# Patient Record
Sex: FEMALE | Race: BLACK OR AFRICAN AMERICAN | Employment: FULL TIME | URBAN - METROPOLITAN AREA
[De-identification: names, ages, dates, MRNs, and addresses within clinical notes are randomized per-mention and may not be internally consistent; named-entity substitution may affect disease eponyms.]

---

## 2019-10-19 ENCOUNTER — HOSPITAL ENCOUNTER (EMERGENCY)
Age: 24
Discharge: HOME | End: 2019-10-19
Attending: EMERGENCY MEDICINE
Payer: COMMERCIAL

## 2019-10-19 VITALS
BODY MASS INDEX: 53.92 KG/M2 | OXYGEN SATURATION: 99 % | SYSTOLIC BLOOD PRESSURE: 156 MMHG | WEIGHT: 293 LBS | DIASTOLIC BLOOD PRESSURE: 80 MMHG | RESPIRATION RATE: 16 BRPM | HEIGHT: 62 IN | HEART RATE: 74 BPM | TEMPERATURE: 98.1 F

## 2019-10-19 DIAGNOSIS — N93.8 DUB (DYSFUNCTIONAL UTERINE BLEEDING): ICD-10-CM

## 2019-10-19 DIAGNOSIS — R10.2 PELVIC CRAMPING: Primary | ICD-10-CM

## 2019-10-19 LAB
ALBUMIN SERPL-MCNC: 3.4 G/DL (ref 3.5–5)
ALBUMIN/GLOB SERPL: 0.5 {RATIO} (ref 1.2–3.5)
ALP SERPL-CCNC: 65 U/L (ref 50–130)
ALT SERPL-CCNC: 76 U/L (ref 12–65)
ANION GAP SERPL CALC-SCNC: 9 MMOL/L (ref 7–16)
APPEARANCE UR: ABNORMAL
AST SERPL-CCNC: 57 U/L (ref 15–37)
BACTERIA URNS QL MICRO: 0 /HPF
BASOPHILS # BLD: 0 K/UL (ref 0–0.2)
BASOPHILS NFR BLD: 0 % (ref 0–2)
BILIRUB SERPL-MCNC: 0.5 MG/DL (ref 0.2–1.1)
BILIRUB UR QL: ABNORMAL
BUN SERPL-MCNC: 8 MG/DL (ref 6–23)
CALCIUM SERPL-MCNC: 9.5 MG/DL (ref 8.3–10.4)
CASTS URNS QL MICRO: 0 /LPF
CHLORIDE SERPL-SCNC: 102 MMOL/L (ref 98–107)
CO2 SERPL-SCNC: 27 MMOL/L (ref 21–32)
COLOR UR: ABNORMAL
CREAT SERPL-MCNC: 0.77 MG/DL (ref 0.6–1)
DIFFERENTIAL METHOD BLD: ABNORMAL
EOSINOPHIL # BLD: 0.2 K/UL (ref 0–0.8)
EOSINOPHIL NFR BLD: 1 % (ref 0.5–7.8)
EPI CELLS #/AREA URNS HPF: ABNORMAL /HPF
ERYTHROCYTE [DISTWIDTH] IN BLOOD BY AUTOMATED COUNT: 20.7 % (ref 11.9–14.6)
GLOBULIN SER CALC-MCNC: 6.3 G/DL (ref 2.3–3.5)
GLUCOSE SERPL-MCNC: 95 MG/DL (ref 65–100)
GLUCOSE UR STRIP.AUTO-MCNC: NEGATIVE MG/DL
HCG SERPL QL: NEGATIVE
HCT VFR BLD AUTO: 34.6 % (ref 35.8–46.3)
HGB BLD-MCNC: 10.4 G/DL (ref 11.7–15.4)
HGB UR QL STRIP: ABNORMAL
IMM GRANULOCYTES # BLD AUTO: 0.1 K/UL (ref 0–0.5)
IMM GRANULOCYTES NFR BLD AUTO: 1 % (ref 0–5)
KETONES UR QL STRIP.AUTO: ABNORMAL MG/DL
LEUKOCYTE ESTERASE UR QL STRIP.AUTO: ABNORMAL
LYMPHOCYTES # BLD: 3.9 K/UL (ref 0.5–4.6)
LYMPHOCYTES NFR BLD: 32 % (ref 13–44)
MCH RBC QN AUTO: 19.7 PG (ref 26.1–32.9)
MCHC RBC AUTO-ENTMCNC: 30.1 G/DL (ref 31.4–35)
MCV RBC AUTO: 65.4 FL (ref 79.6–97.8)
MONOCYTES # BLD: 0.7 K/UL (ref 0.1–1.3)
MONOCYTES NFR BLD: 5 % (ref 4–12)
NEUTS SEG # BLD: 7.4 K/UL (ref 1.7–8.2)
NEUTS SEG NFR BLD: 61 % (ref 43–78)
NITRITE UR QL STRIP.AUTO: NEGATIVE
NRBC # BLD: 0 K/UL (ref 0–0.2)
PH UR STRIP: 5.5 [PH] (ref 5–9)
PLATELET # BLD AUTO: 589 K/UL (ref 150–450)
PMV BLD AUTO: 9.6 FL (ref 9.4–12.3)
POTASSIUM SERPL-SCNC: 4.6 MMOL/L (ref 3.5–5.1)
PROT SERPL-MCNC: 9.7 G/DL (ref 6.3–8.2)
PROT UR STRIP-MCNC: 100 MG/DL
RBC # BLD AUTO: 5.29 M/UL (ref 4.05–5.2)
RBC #/AREA URNS HPF: >100 /HPF
SERVICE CMNT-IMP: NORMAL
SODIUM SERPL-SCNC: 138 MMOL/L (ref 136–145)
SP GR UR REFRACTOMETRY: 1.02 (ref 1–1.02)
UROBILINOGEN UR QL STRIP.AUTO: 1 EU/DL (ref 0.2–1)
WBC # BLD AUTO: 12.2 K/UL (ref 4.3–11.1)
WBC URNS QL MICRO: ABNORMAL /HPF
WET PREP GENITAL: NORMAL
WET PREP GENITAL: NORMAL

## 2019-10-19 PROCEDURE — 87210 SMEAR WET MOUNT SALINE/INK: CPT

## 2019-10-19 PROCEDURE — 87491 CHLMYD TRACH DNA AMP PROBE: CPT

## 2019-10-19 PROCEDURE — 84703 CHORIONIC GONADOTROPIN ASSAY: CPT

## 2019-10-19 PROCEDURE — 96374 THER/PROPH/DIAG INJ IV PUSH: CPT | Performed by: EMERGENCY MEDICINE

## 2019-10-19 PROCEDURE — 85025 COMPLETE CBC W/AUTO DIFF WBC: CPT

## 2019-10-19 PROCEDURE — 81001 URINALYSIS AUTO W/SCOPE: CPT

## 2019-10-19 PROCEDURE — 74011250636 HC RX REV CODE- 250/636: Performed by: NURSE PRACTITIONER

## 2019-10-19 PROCEDURE — 80053 COMPREHEN METABOLIC PANEL: CPT

## 2019-10-19 PROCEDURE — 99284 EMERGENCY DEPT VISIT MOD MDM: CPT | Performed by: EMERGENCY MEDICINE

## 2019-10-19 PROCEDURE — 74011250637 HC RX REV CODE- 250/637: Performed by: NURSE PRACTITIONER

## 2019-10-19 RX ORDER — KETOROLAC TROMETHAMINE 30 MG/ML
30 INJECTION, SOLUTION INTRAMUSCULAR; INTRAVENOUS
Status: COMPLETED | OUTPATIENT
Start: 2019-10-19 | End: 2019-10-19

## 2019-10-19 RX ORDER — NORETHINDRONE 5 MG/1
5 TABLET ORAL DAILY
COMMUNITY
End: 2019-10-19

## 2019-10-19 RX ORDER — HYOSCYAMINE SULFATE 0.12 MG/1
0.12 TABLET SUBLINGUAL
Status: COMPLETED | OUTPATIENT
Start: 2019-10-19 | End: 2019-10-19

## 2019-10-19 RX ORDER — IBUPROFEN 600 MG/1
600 TABLET ORAL
Qty: 20 TAB | Refills: 0 | Status: SHIPPED | OUTPATIENT
Start: 2019-10-19

## 2019-10-19 RX ORDER — MEDROXYPROGESTERONE ACETATE 10 MG/1
10 TABLET ORAL DAILY
Qty: 5 TAB | Refills: 0 | Status: SHIPPED | OUTPATIENT
Start: 2019-10-19 | End: 2019-10-24

## 2019-10-19 RX ORDER — HYOSCYAMINE SULFATE 0.12 MG/1
0.12 TABLET SUBLINGUAL
Qty: 20 TAB | Refills: 0 | Status: SHIPPED | OUTPATIENT
Start: 2019-10-19

## 2019-10-19 RX ADMIN — HYOSCYAMINE SULFATE 0.12 MG: 0.12 TABLET ORAL; SUBLINGUAL at 14:15

## 2019-10-19 RX ADMIN — KETOROLAC TROMETHAMINE 30 MG: 30 INJECTION, SOLUTION INTRAMUSCULAR at 14:15

## 2019-10-19 NOTE — ED PROVIDER NOTES
27-year-old female presents for complaint of increased cramping with vaginal bleeding. She has been seeing her GYN physician since September due to irregular vaginal bleeding. She was placed on orphenadrine at that time she has had it refilled one time which she is still on. She states that her bleeding is about the same with clots about the same. But today she is having abnormal cramping. She denies nausea vomiting diarrhea fever chills weakness or dizziness. She is here today because of the cramping and she is tired of bleeding. No past medical history on file. No past surgical history on file. No family history on file.     Social History     Socioeconomic History    Marital status: SINGLE     Spouse name: Not on file    Number of children: Not on file    Years of education: Not on file    Highest education level: Not on file   Occupational History    Not on file   Social Needs    Financial resource strain: Not on file    Food insecurity:     Worry: Not on file     Inability: Not on file    Transportation needs:     Medical: Not on file     Non-medical: Not on file   Tobacco Use    Smoking status: Never Smoker    Smokeless tobacco: Never Used   Substance and Sexual Activity    Alcohol use: Never     Frequency: Never    Drug use: Never    Sexual activity: Not on file   Lifestyle    Physical activity:     Days per week: Not on file     Minutes per session: Not on file    Stress: Not on file   Relationships    Social connections:     Talks on phone: Not on file     Gets together: Not on file     Attends Rastafari service: Not on file     Active member of club or organization: Not on file     Attends meetings of clubs or organizations: Not on file     Relationship status: Not on file    Intimate partner violence:     Fear of current or ex partner: Not on file     Emotionally abused: Not on file     Physically abused: Not on file     Forced sexual activity: Not on file   Other Topics Concern    Not on file   Social History Narrative    Not on file         ALLERGIES: Patient has no known allergies. Review of Systems   Constitutional: Negative for chills and fever. HENT: Negative for facial swelling and mouth sores. Eyes: Negative for discharge and redness. Respiratory: Negative for cough and shortness of breath. Cardiovascular: Negative for chest pain and leg swelling. Gastrointestinal: Negative for diarrhea, nausea and vomiting. Genitourinary: Positive for pelvic pain and vaginal bleeding. Negative for dysuria and flank pain. Musculoskeletal: Negative for gait problem and joint swelling. Skin: Negative for color change and wound. Neurological: Negative for dizziness and weakness. Psychiatric/Behavioral: Negative for confusion and decreased concentration. Vitals:    10/19/19 1124   BP: (!) 170/95   Pulse: 78   Resp: 18   Temp: 98 °F (36.7 °C)   SpO2: 98%   Weight: (!) 167.4 kg (369 lb)   Height: 5' 2\" (1.575 m)            Physical Exam   Constitutional: She is oriented to person, place, and time. She appears well-developed and well-nourished. HENT:   Head: Normocephalic and atraumatic. Eyes: Pupils are equal, round, and reactive to light. EOM are normal.   Neck: Normal range of motion. Neck supple. Cardiovascular: Normal rate and regular rhythm. Pulmonary/Chest: Effort normal and breath sounds normal.   Abdominal: Soft. There is tenderness in the suprapubic area. Musculoskeletal: Normal range of motion. Neurological: She is alert and oriented to person, place, and time. Skin: Skin is warm and dry. Capillary refill takes less than 2 seconds. She is not diaphoretic. Psychiatric: She has a normal mood and affect. Her behavior is normal. Thought content normal.   Nursing note and vitals reviewed. MDM  Number of Diagnoses or Management Options  Diagnosis management comments: Pelvic exam with mild bleeding present.   Mild suprapubic tenderness. We will evaluate urinalysis as well as baseline blood work and provide pain control. She denies that she is sexually active since she started her northinodrin    1:30 PM  D/w Dr Devon Johnson, pt still has not voided. Will provide pain control in ed, stop bcp she is on and start provera for 5 days at time of dc. Waiting urine  1:52 PM  Pt has just voided, but not enough to eval in ed. Sent to lab. Will provide levsin while waiting.  2:49 PM  Pt has received levsin and toradol with little relief of cramps. Urine without bacteria. Discussed w pt and family provera and home with heating pad, return if sx worsen or are not relieved with this and she agrees. Amount and/or Complexity of Data Reviewed  Clinical lab tests: ordered and reviewed  Discuss the patient with other providers: yes (gus)    Risk of Complications, Morbidity, and/or Mortality  Presenting problems: minimal  Diagnostic procedures: low  Management options: low    Patient Progress  Patient progress: stable         Pelvic Exam  Date/Time: 10/19/2019 12:44 PM  Performed by: NP  Procedure duration:  5 minutes. Exam assisted by:  Estela Alatorre RN. Type of exam performed: bimanual and speculum. External genitalia appearance: normal.    Vaginal exam:  bleeding. Bleeding: mild  Cervical exam:  minimal cervical motion tenderness and active bleeding from os. Specimen(s) collected:  chlamydia and GC. Bimanual exam:  normal.    Patient tolerance: Patient tolerated the procedure well with no immediate complications  Comments: Difficulty palpating ovaries due to pt size.

## 2019-10-19 NOTE — DISCHARGE INSTRUCTIONS
Patient Education        Abnormal Uterine Bleeding: Care Instructions  Your Care Instructions    Abnormal uterine bleeding (AUB) is irregular bleeding from the uterus that is longer or heavier than usual or does not occur at your regular time. Sometimes it is caused by changes in hormone levels. It can also be caused by growths in the uterus, such as fibroids or polyps. Sometimes a cause cannot be found. You may have heavy bleeding when you are not expecting your period. Your doctor may suggest a pregnancy test, if you think you are pregnant. Follow-up care is a key part of your treatment and safety. Be sure to make and go to all appointments, and call your doctor if you are having problems. It's also a good idea to know your test results and keep a list of the medicines you take. How can you care for yourself at home? · Be safe with medicines. Take pain medicines exactly as directed. ? If the doctor gave you a prescription medicine for pain, take it as prescribed. ? If you are not taking a prescription pain medicine, ask your doctor if you can take an over-the-counter medicine. · You may be low in iron because of blood loss. Eat a balanced diet that is high in iron and vitamin C. Foods rich in iron include red meat, shellfish, eggs, beans, and leafy green vegetables. Talk to your doctor about whether you need to take iron pills or a multivitamin. When should you call for help? Call 911 anytime you think you may need emergency care. For example, call if:    · You passed out (lost consciousness).    Call your doctor now or seek immediate medical care if:    · You have new or worse belly or pelvic pain.     · You have severe vaginal bleeding.     · You feel dizzy or lightheaded, or you feel like you may faint.    Watch closely for changes in your health, and be sure to contact your doctor if:    · You think you may be pregnant.     · Your bleeding gets worse.     · You do not get better as expected.    Where can you learn more? Go to http://dolores-charis.info/. Enter Y290 in the search box to learn more about \"Abnormal Uterine Bleeding: Care Instructions. \"  Current as of: February 19, 2019  Content Version: 12.2  © 0619-3683 [employer]. Care instructions adapted under license by [employer] (which disclaims liability or warranty for this information). If you have questions about a medical condition or this instruction, always ask your healthcare professional. [address] any warranty or liability for your use of this information. Patient Education        Pelvic Pain: Care Instructions  Your Care Instructions    Pelvic pain, or pain in the lower belly, can have many causes. Often pelvic pain is not serious and gets better in a few days. If your pain continues or gets worse, you may need tests and treatment. Tell your doctor about any new symptoms. These may be signs of a serious problem. Follow-up care is a key part of your treatment and safety. Be sure to make and go to all appointments, and call your doctor if you are having problems. It's also a good idea to know your test results and keep a list of the medicines you take. How can you care for yourself at home? · Rest until you feel better. Lie down, and raise your legs by placing a pillow under your knees. · Drink plenty of fluids. You may find that small, frequent sips are easier on your stomach than if you drink a lot at once. Avoid drinks with carbonation or caffeine, such as soda pop, tea, or coffee. · Try eating several small meals instead of 2 or 3 large ones. Eat mild foods, such as rice, dry toast or crackers, bananas, and applesauce. Avoid fatty and spicy foods, other fruits, and alcohol until 48 hours after your symptoms have gone away. · Take an over-the-counter pain medicine, such as acetaminophen (Tylenol), ibuprofen (Advil, Motrin), or naproxen (Aleve).  Read and follow all instructions on the label. · Do not take two or more pain medicines at the same time unless the doctor told you to. Many pain medicines have acetaminophen, which is Tylenol. Too much acetaminophen (Tylenol) can be harmful. · You can put a heating pad, a warm cloth, or moist heat on your belly to relieve pain. When should you call for help? Call your doctor now or seek immediate medical care if:    · You have a new or higher fever.     · You have unusual vaginal bleeding.     · You have new or worse belly or pelvic pain.     · You have vaginal discharge that has increased in amount or smells bad.    Watch closely for changes in your health, and be sure to contact your doctor if:    · You do not get better as expected. Where can you learn more? Go to http://dolores-charis.info/. Enter [id number] in the search box to learn more about \"Pelvic Pain: Care Instructions. \"  Current as of: February 19, 2019  Content Version: 12.2  © 5589-1610 [employer]. Care instructions adapted under license by [employer] (which disclaims liability or warranty for this information). If you have questions about a medical condition or this instruction, always ask your healthcare professional. [address] any warranty or liability for your use of this information. home with family    STOP your previous medication and take Provera for 5 days. May also take levsin for cramping every 4 hours as needed. Take ibuprofen every 6-8 hours as needed for pain.

## 2019-10-19 NOTE — ED NOTES
I have reviewed discharge instructions with the patient. The patient verbalized understanding. Patient left ED via Discharge Method: ambulatory to Home with family. Opportunity for questions and clarification provided. Patient given 3 scripts. To continue your aftercare when you leave the hospital, you may receive an automated call from our care team to check in on how you are doing. This is a free service and part of our promise to provide the best care and service to meet your aftercare needs.  If you have questions, or wish to unsubscribe from this service please call [phone]. Thank you for Choosing our [facility].

## 2019-10-23 LAB
C TRACH RRNA SPEC QL NAA+PROBE: NEGATIVE
N GONORRHOEA RRNA SPEC QL NAA+PROBE: NEGATIVE
SPECIMEN SOURCE: NORMAL